# Patient Record
Sex: FEMALE | Race: WHITE | Employment: OTHER | URBAN - METROPOLITAN AREA
[De-identification: names, ages, dates, MRNs, and addresses within clinical notes are randomized per-mention and may not be internally consistent; named-entity substitution may affect disease eponyms.]

---

## 2017-02-10 PROCEDURE — 84443 ASSAY THYROID STIM HORMONE: CPT | Performed by: INTERNAL MEDICINE

## 2017-02-10 PROCEDURE — 83001 ASSAY OF GONADOTROPIN (FSH): CPT | Performed by: INTERNAL MEDICINE

## 2017-02-10 PROCEDURE — 83002 ASSAY OF GONADOTROPIN (LH): CPT | Performed by: INTERNAL MEDICINE

## 2017-02-10 PROCEDURE — 36415 COLL VENOUS BLD VENIPUNCTURE: CPT | Performed by: INTERNAL MEDICINE

## 2017-02-10 PROCEDURE — 84439 ASSAY OF FREE THYROXINE: CPT | Performed by: INTERNAL MEDICINE

## 2017-02-10 PROCEDURE — 85025 COMPLETE CBC W/AUTO DIFF WBC: CPT | Performed by: INTERNAL MEDICINE

## 2017-05-16 PROBLEM — D18.02 CAVERNOUS HEMANGIOMA OF BRAIN (HCC): Status: ACTIVE | Noted: 2017-05-16

## 2017-05-16 PROBLEM — I70.8 AORTO-ILIAC ATHEROSCLEROSIS (HCC): Status: ACTIVE | Noted: 2017-05-16

## 2017-05-16 PROBLEM — I70.0 AORTO-ILIAC ATHEROSCLEROSIS (HCC): Status: ACTIVE | Noted: 2017-05-16

## 2017-05-16 PROCEDURE — 82570 ASSAY OF URINE CREATININE: CPT | Performed by: INTERNAL MEDICINE

## 2017-05-16 PROCEDURE — 36415 COLL VENOUS BLD VENIPUNCTURE: CPT | Performed by: INTERNAL MEDICINE

## 2017-05-16 PROCEDURE — 82043 UR ALBUMIN QUANTITATIVE: CPT | Performed by: INTERNAL MEDICINE

## 2018-01-18 PROBLEM — F32.0 CURRENT MILD EPISODE OF MAJOR DEPRESSIVE DISORDER WITHOUT PRIOR EPISODE (HCC): Status: ACTIVE | Noted: 2018-01-18

## 2018-01-18 PROBLEM — F42.2 MIXED OBSESSIONAL THOUGHTS AND ACTS: Status: ACTIVE | Noted: 2018-01-18

## 2018-01-18 PROBLEM — F32.1 CURRENT MODERATE EPISODE OF MAJOR DEPRESSIVE DISORDER WITHOUT PRIOR EPISODE (HCC): Status: ACTIVE | Noted: 2018-01-18

## 2018-04-23 PROCEDURE — 82607 VITAMIN B-12: CPT | Performed by: INTERNAL MEDICINE

## 2018-05-01 PROBLEM — F13.20 BENZODIAZEPINE DEPENDENCE (HCC): Status: ACTIVE | Noted: 2018-05-01

## 2018-05-01 PROBLEM — F13.239 BENZODIAZEPINE WITHDRAWAL WITH COMPLICATION (HCC): Status: ACTIVE | Noted: 2018-05-01

## 2018-05-13 PROBLEM — I36.1 NON-RHEUMATIC TRICUSPID VALVE INSUFFICIENCY: Status: ACTIVE | Noted: 2018-05-13

## 2018-05-13 PROBLEM — I27.20 PULMONARY HTN (HCC): Status: ACTIVE | Noted: 2018-05-13

## 2018-05-13 PROBLEM — I34.0 NON-RHEUMATIC MITRAL REGURGITATION: Status: ACTIVE | Noted: 2018-05-13

## 2018-07-26 PROBLEM — K57.92 ACUTE DIVERTICULITIS: Status: ACTIVE | Noted: 2018-07-26

## 2018-08-02 PROBLEM — H60.502 ACUTE OTITIS EXTERNA OF LEFT EAR, UNSPECIFIED TYPE: Status: ACTIVE | Noted: 2018-08-02

## 2018-08-10 PROBLEM — H60.502 ACUTE OTITIS EXTERNA OF LEFT EAR, UNSPECIFIED TYPE: Status: RESOLVED | Noted: 2018-08-02 | Resolved: 2018-08-10

## 2018-10-03 PROBLEM — K57.92 ACUTE DIVERTICULITIS: Status: RESOLVED | Noted: 2018-07-26 | Resolved: 2018-10-03

## 2018-10-29 PROBLEM — G89.29 CHRONIC NECK PAIN: Status: ACTIVE | Noted: 2018-10-29

## 2018-10-29 PROBLEM — M75.51 BURSITIS OF RIGHT SHOULDER: Status: ACTIVE | Noted: 2018-10-29

## 2018-10-29 PROBLEM — M47.812 FACET ARTHRITIS OF CERVICAL REGION: Status: ACTIVE | Noted: 2018-10-29

## 2018-10-29 PROBLEM — M54.2 CHRONIC NECK PAIN: Status: ACTIVE | Noted: 2018-10-29

## 2019-02-05 PROBLEM — G89.29 CHRONIC NECK PAIN: Status: RESOLVED | Noted: 2018-10-29 | Resolved: 2019-02-05

## 2019-02-05 PROBLEM — M54.2 CHRONIC NECK PAIN: Status: RESOLVED | Noted: 2018-10-29 | Resolved: 2019-02-05

## 2019-02-05 PROBLEM — M75.51 BURSITIS OF RIGHT SHOULDER: Status: RESOLVED | Noted: 2018-10-29 | Resolved: 2019-02-05

## 2019-02-05 PROBLEM — F13.239 BENZODIAZEPINE WITHDRAWAL WITH COMPLICATION (HCC): Status: RESOLVED | Noted: 2018-05-01 | Resolved: 2019-02-05

## 2019-04-05 ENCOUNTER — LAB ENCOUNTER (OUTPATIENT)
Dept: LAB | Age: 79
End: 2019-04-05
Attending: INTERNAL MEDICINE
Payer: MEDICARE

## 2019-04-05 DIAGNOSIS — R53.83 FATIGUE: Primary | ICD-10-CM

## 2019-04-05 PROCEDURE — 84443 ASSAY THYROID STIM HORMONE: CPT

## 2019-04-05 PROCEDURE — 85025 COMPLETE CBC W/AUTO DIFF WBC: CPT

## 2019-04-30 PROBLEM — J30.9 ALLERGIC RHINITIS, UNSPECIFIED SEASONALITY, UNSPECIFIED TRIGGER: Status: ACTIVE | Noted: 2019-04-30

## 2019-08-15 PROBLEM — M17.0 PRIMARY OSTEOARTHRITIS OF BOTH KNEES: Status: ACTIVE | Noted: 2019-08-15

## 2020-02-03 PROBLEM — G43.109 OPHTHALMIC MIGRAINE: Status: ACTIVE | Noted: 2020-02-03

## 2020-02-03 PROBLEM — Z00.00 ANNUAL PHYSICAL EXAM: Status: ACTIVE | Noted: 2020-02-03

## 2020-02-17 PROBLEM — F51.05 MOOD INSOMNIA (HCC): Status: ACTIVE | Noted: 2020-02-17

## 2020-02-17 PROBLEM — F39 MOOD INSOMNIA (HCC): Status: ACTIVE | Noted: 2020-02-17

## 2020-02-26 PROBLEM — J11.1 INFLUENZA: Status: ACTIVE | Noted: 2020-02-26

## 2020-02-26 PROBLEM — R05.9 COUGH: Status: ACTIVE | Noted: 2020-02-26

## 2020-02-26 PROBLEM — M79.10 MYALGIA: Status: ACTIVE | Noted: 2020-02-26

## 2020-03-02 PROBLEM — J06.9 ACUTE URI: Status: ACTIVE | Noted: 2020-03-02

## 2020-03-02 PROBLEM — M79.10 MYALGIA: Status: RESOLVED | Noted: 2020-02-26 | Resolved: 2020-03-02

## 2020-03-02 PROBLEM — J11.1 INFLUENZA: Status: RESOLVED | Noted: 2020-02-26 | Resolved: 2020-03-02

## 2020-03-02 PROBLEM — R05.9 COUGH: Status: RESOLVED | Noted: 2020-02-26 | Resolved: 2020-03-02

## 2020-03-02 PROBLEM — J01.00 ACUTE NON-RECURRENT MAXILLARY SINUSITIS: Status: ACTIVE | Noted: 2020-03-02

## 2020-03-19 PROBLEM — R07.81 PLEURITIC CHEST PAIN: Status: ACTIVE | Noted: 2020-03-19

## 2020-05-07 PROBLEM — J01.00 ACUTE NON-RECURRENT MAXILLARY SINUSITIS: Status: RESOLVED | Noted: 2020-03-02 | Resolved: 2020-05-07

## 2020-05-07 PROBLEM — J06.9 ACUTE URI: Status: RESOLVED | Noted: 2020-03-02 | Resolved: 2020-05-07

## 2020-05-07 PROBLEM — M81.0 AGE-RELATED OSTEOPOROSIS WITHOUT CURRENT PATHOLOGICAL FRACTURE: Status: ACTIVE | Noted: 2020-05-07

## 2020-05-29 PROBLEM — I35.1 MODERATE AORTIC REGURGITATION: Status: ACTIVE | Noted: 2020-05-29

## 2020-05-29 PROBLEM — R07.81 PLEURITIC CHEST PAIN: Status: RESOLVED | Noted: 2020-03-19 | Resolved: 2020-05-29

## 2020-08-18 PROBLEM — R09.82 POST-NASAL DRAINAGE: Status: ACTIVE | Noted: 2020-08-18

## 2020-08-18 PROBLEM — R68.83 CHILLS: Status: ACTIVE | Noted: 2020-08-18

## 2020-08-18 PROBLEM — R53.83 FATIGUE, UNSPECIFIED TYPE: Status: ACTIVE | Noted: 2020-08-18

## 2020-09-30 PROBLEM — R10.12 LEFT UPPER QUADRANT ABDOMINAL PAIN: Status: ACTIVE | Noted: 2020-09-30

## 2020-09-30 PROBLEM — R10.13 EPIGASTRIC PAIN: Status: ACTIVE | Noted: 2020-09-30

## 2020-09-30 PROBLEM — R53.83 FATIGUE, UNSPECIFIED TYPE: Status: RESOLVED | Noted: 2020-08-18 | Resolved: 2020-09-30

## 2020-10-02 PROBLEM — R68.83 CHILLS: Status: RESOLVED | Noted: 2020-08-18 | Resolved: 2020-10-02

## 2020-10-02 PROBLEM — J31.0 CHRONIC RHINITIS: Status: ACTIVE | Noted: 2020-10-02

## 2020-10-02 PROBLEM — R10.13 EPIGASTRIC PAIN: Status: RESOLVED | Noted: 2020-09-30 | Resolved: 2020-10-02

## 2020-10-02 PROBLEM — K29.30 CHRONIC SUPERFICIAL GASTRITIS WITHOUT BLEEDING: Status: ACTIVE | Noted: 2020-10-02

## 2020-10-19 PROBLEM — R10.12 LEFT UPPER QUADRANT ABDOMINAL PAIN: Status: RESOLVED | Noted: 2020-09-30 | Resolved: 2020-10-19

## 2020-10-19 PROBLEM — J30.9 ALLERGIC RHINITIS, UNSPECIFIED SEASONALITY, UNSPECIFIED TRIGGER: Status: RESOLVED | Noted: 2019-04-30 | Resolved: 2020-10-19

## 2020-12-08 PROBLEM — L02.611 FOOT ABSCESS, RIGHT: Status: ACTIVE | Noted: 2020-12-08

## 2020-12-08 PROBLEM — L03.031 CELLULITIS OF GREAT TOE OF RIGHT FOOT: Status: ACTIVE | Noted: 2020-12-08

## 2021-01-11 PROBLEM — L03.031 CELLULITIS OF GREAT TOE OF RIGHT FOOT: Status: RESOLVED | Noted: 2020-12-08 | Resolved: 2021-01-11

## 2021-01-11 PROBLEM — L02.611 FOOT ABSCESS, RIGHT: Status: RESOLVED | Noted: 2020-12-08 | Resolved: 2021-01-11

## 2021-01-11 PROBLEM — R91.1 LUNG NODULE: Status: ACTIVE | Noted: 2021-01-11

## 2021-01-19 PROBLEM — R10.32 LEFT LOWER QUADRANT ABDOMINAL PAIN: Status: ACTIVE | Noted: 2021-01-19

## 2021-02-10 ENCOUNTER — HOSPITAL ENCOUNTER (EMERGENCY)
Age: 81
Discharge: HOME | End: 2021-02-10
Attending: EMERGENCY MEDICINE
Payer: MEDICARE

## 2021-02-10 ENCOUNTER — APPOINTMENT (OUTPATIENT)
Dept: CT IMAGING | Age: 81
End: 2021-02-10
Attending: EMERGENCY MEDICINE
Payer: MEDICARE

## 2021-02-10 VITALS
RESPIRATION RATE: 16 BRPM | BODY MASS INDEX: 23 KG/M2 | OXYGEN SATURATION: 98 % | HEART RATE: 86 BPM | TEMPERATURE: 98 F | SYSTOLIC BLOOD PRESSURE: 139 MMHG | DIASTOLIC BLOOD PRESSURE: 77 MMHG | WEIGHT: 123.69 LBS

## 2021-02-10 DIAGNOSIS — R51.9 ACUTE NONINTRACTABLE HEADACHE, UNSPECIFIED HEADACHE TYPE: Primary | ICD-10-CM

## 2021-02-10 PROCEDURE — 99284 EMERGENCY DEPT VISIT MOD MDM: CPT

## 2021-02-10 PROCEDURE — 70450 CT HEAD/BRAIN W/O DYE: CPT | Performed by: EMERGENCY MEDICINE

## 2021-02-10 RX ORDER — ACETAMINOPHEN AND CODEINE PHOSPHATE 300; 30 MG/1; MG/1
1 TABLET ORAL ONCE
Status: COMPLETED | OUTPATIENT
Start: 2021-02-10 | End: 2021-02-10

## 2021-02-10 RX ORDER — ACETAMINOPHEN AND CODEINE PHOSPHATE 300; 30 MG/1; MG/1
1-2 TABLET ORAL EVERY 6 HOURS PRN
Qty: 10 TABLET | Refills: 0 | Status: SHIPPED | OUTPATIENT
Start: 2021-02-10 | End: 2021-02-17

## 2021-02-10 NOTE — ED PROVIDER NOTES
Patient Seen in: THE [facility] Emergency Department In [place]      History   Patient presents with:  Headache    Stated Complaint: headache    HPI/Subjective:   HPI    Patient is a 27-year-old female who presents for evaluation of waxing waning right-sided hypercholesterolemia 4/26/2007   • Radiculopathy of cervicothoracic region 12/8/2015   • Rhinitis 5/7/2014   • Seizure disorder ([facility] [address] 2/1/2011   • Urinary incontinence 5/30/2012   • Urinary tract infection 3/27/2015   • Valgus deformity of great toe 8/27/2 General: Bowel sounds are normal.      Palpations: Abdomen is soft. Musculoskeletal: Normal range of motion. Skin:     General: Skin is warm and dry. Neurological:      Mental Status: She is alert and oriented to person, place, and time. of acute hematoma, and there is also no mass effect, midline shift, halo of edema, and it is noted that the patient's symptoms are right-sided. Otherwise, there are mild chronic ischemic  white matter changes and probably old left lacunar infarct.   No pre

## 2021-02-22 PROBLEM — R51.9 ACUTE NONINTRACTABLE HEADACHE, UNSPECIFIED HEADACHE TYPE: Status: ACTIVE | Noted: 2021-02-22

## 2021-02-22 PROBLEM — M26.623 BILATERAL TEMPOROMANDIBULAR JOINT PAIN: Status: ACTIVE | Noted: 2021-02-22
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
## 2021-04-06 PROBLEM — M53.3 TAIL BONE PAIN: Status: ACTIVE | Noted: 2021-04-06

## 2021-04-06 PROBLEM — G25.81 RESTLESS LEG SYNDROME: Status: ACTIVE | Noted: 2021-04-06

## 2021-04-06 PROBLEM — M53.3 SI (SACROILIAC) PAIN: Status: ACTIVE | Noted: 2021-04-06

## 2021-04-13 PROBLEM — R10.32 LEFT LOWER QUADRANT ABDOMINAL PAIN: Status: RESOLVED | Noted: 2021-01-19 | Resolved: 2021-04-13

## 2021-04-13 PROBLEM — R51.9 ACUTE NONINTRACTABLE HEADACHE, UNSPECIFIED HEADACHE TYPE: Status: RESOLVED | Noted: 2021-02-22 | Resolved: 2021-04-13
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
## 2021-05-20 PROBLEM — F39 MOOD INSOMNIA (HCC): Status: RESOLVED | Noted: 2020-02-17 | Resolved: 2021-05-20

## 2021-05-20 PROBLEM — M62.830 BACK MUSCLE SPASM: Status: ACTIVE | Noted: 2021-05-20

## 2021-05-20 PROBLEM — F51.05 MOOD INSOMNIA (HCC): Status: RESOLVED | Noted: 2020-02-17 | Resolved: 2021-05-20

## 2021-05-20 PROBLEM — M62.838 NECK MUSCLE SPASM: Status: ACTIVE | Noted: 2021-05-20

## 2021-05-20 PROBLEM — S83.412D SPRAIN OF MEDIAL COLLATERAL LIGAMENT OF LEFT KNEE, SUBSEQUENT ENCOUNTER: Status: ACTIVE | Noted: 2021-05-20

## 2021-06-03 PROBLEM — M53.3 TAIL BONE PAIN: Status: RESOLVED | Noted: 2021-04-06 | Resolved: 2021-06-03

## 2021-07-25 PROBLEM — M26.623 BILATERAL TEMPOROMANDIBULAR JOINT PAIN: Status: RESOLVED | Noted: 2021-02-22 | Resolved: 2021-07-25

## 2021-07-25 PROBLEM — M53.3 SI (SACROILIAC) PAIN: Status: RESOLVED | Noted: 2021-04-06 | Resolved: 2021-07-25

## 2021-07-25 PROBLEM — M62.838 NECK MUSCLE SPASM: Status: RESOLVED | Noted: 2021-05-20 | Resolved: 2021-07-25

## 2021-07-25 PROBLEM — S83.412D SPRAIN OF MEDIAL COLLATERAL LIGAMENT OF LEFT KNEE, SUBSEQUENT ENCOUNTER: Status: RESOLVED | Noted: 2021-05-20 | Resolved: 2021-07-25

## 2021-09-23 PROBLEM — M62.830 BACK MUSCLE SPASM: Status: RESOLVED | Noted: 2021-05-20 | Resolved: 2021-09-23

## 2021-10-28 PROBLEM — H69.81 DYSFUNCTION OF RIGHT EUSTACHIAN TUBE: Status: ACTIVE | Noted: 2021-10-28

## 2021-12-02 PROBLEM — D72.819 LEUKOPENIA, UNSPECIFIED TYPE: Status: ACTIVE | Noted: 2021-12-02

## 2021-12-02 PROBLEM — N39.41 URGE INCONTINENCE OF URINE: Status: ACTIVE | Noted: 2021-12-02

## 2022-01-24 PROBLEM — G31.9 CEREBRAL ATROPHY (HCC): Status: ACTIVE | Noted: 2022-01-24

## 2022-01-25 PROBLEM — H69.81 DYSFUNCTION OF RIGHT EUSTACHIAN TUBE: Status: RESOLVED | Noted: 2021-10-28 | Resolved: 2022-01-25

## 2022-01-25 PROBLEM — R09.82 POST-NASAL DRAINAGE: Status: RESOLVED | Noted: 2020-08-18 | Resolved: 2022-01-25

## 2022-01-25 PROBLEM — K29.30 CHRONIC SUPERFICIAL GASTRITIS WITHOUT BLEEDING: Status: RESOLVED | Noted: 2020-10-02 | Resolved: 2022-01-25

## 2022-01-25 PROBLEM — M46.92 UNSPECIFIED INFLAMMATORY SPONDYLOPATHY, CERVICAL REGION (HCC): Status: ACTIVE | Noted: 2022-01-25
